# Patient Record
Sex: FEMALE | URBAN - METROPOLITAN AREA
[De-identification: names, ages, dates, MRNs, and addresses within clinical notes are randomized per-mention and may not be internally consistent; named-entity substitution may affect disease eponyms.]

---

## 2021-05-13 ENCOUNTER — OFFICE VISIT (OUTPATIENT)
Dept: URBAN - METROPOLITAN AREA CLINIC 17 | Facility: CLINIC | Age: 71
End: 2021-05-13
Payer: MEDICARE

## 2021-05-13 DIAGNOSIS — B02.39 OTHER HERPES ZOSTER EYE DISEASE: Primary | ICD-10-CM

## 2021-05-13 DIAGNOSIS — H25.13 AGE-RELATED NUCLEAR CATARACT, BILATERAL: ICD-10-CM

## 2021-05-13 PROCEDURE — 99203 OFFICE O/P NEW LOW 30 MIN: CPT | Performed by: OPTOMETRIST

## 2021-05-13 ASSESSMENT — INTRAOCULAR PRESSURE
OS: 15
OD: 17

## 2021-05-13 NOTE — IMPRESSION/PLAN
Impression: Other herpes zoster eye disease: B02.39. Lesions on eyelid, no corneal involvement Plan: Discussed diagnosis in detail with patient. Continue Acyclovir as directed by PCP. No Rx drops needed at this time. Patient instructed to call if developing blurry vision.